# Patient Record
Sex: FEMALE | Race: OTHER | NOT HISPANIC OR LATINO | URBAN - METROPOLITAN AREA
[De-identification: names, ages, dates, MRNs, and addresses within clinical notes are randomized per-mention and may not be internally consistent; named-entity substitution may affect disease eponyms.]

---

## 2019-11-16 ENCOUNTER — OUTPATIENT (OUTPATIENT)
Dept: INPATIENT UNIT | Facility: HOSPITAL | Age: 29
LOS: 1 days | Discharge: HOME | End: 2019-11-16
Payer: MEDICAID

## 2019-11-16 ENCOUNTER — EMERGENCY (EMERGENCY)
Facility: HOSPITAL | Age: 29
LOS: 1 days | Discharge: STILL ADMITTED OR ADMINISTRATIVE | End: 2019-11-16
Admitting: EMERGENCY MEDICINE

## 2019-11-16 VITALS
DIASTOLIC BLOOD PRESSURE: 75 MMHG | HEART RATE: 92 BPM | OXYGEN SATURATION: 100 % | RESPIRATION RATE: 16 BRPM | TEMPERATURE: 98 F | SYSTOLIC BLOOD PRESSURE: 124 MMHG

## 2019-11-16 VITALS — HEART RATE: 80 BPM | SYSTOLIC BLOOD PRESSURE: 104 MMHG | DIASTOLIC BLOOD PRESSURE: 65 MMHG

## 2019-11-16 VITALS
TEMPERATURE: 98 F | HEART RATE: 89 BPM | SYSTOLIC BLOOD PRESSURE: 111 MMHG | RESPIRATION RATE: 16 BRPM | DIASTOLIC BLOOD PRESSURE: 65 MMHG

## 2019-11-16 DIAGNOSIS — O26.899 OTHER SPECIFIED PREGNANCY RELATED CONDITIONS, UNSPECIFIED TRIMESTER: ICD-10-CM

## 2019-11-16 DIAGNOSIS — Z3A.00 WEEKS OF GESTATION OF PREGNANCY NOT SPECIFIED: ICD-10-CM

## 2019-11-16 PROCEDURE — 99214 OFFICE O/P EST MOD 30 MIN: CPT | Mod: 25

## 2019-11-16 PROCEDURE — 76818 FETAL BIOPHYS PROFILE W/NST: CPT | Mod: 26

## 2019-11-16 PROCEDURE — 59025 FETAL NON-STRESS TEST: CPT | Mod: 26,59

## 2019-11-16 NOTE — OB PROVIDER TRIAGE NOTE - HISTORY OF PRESENT ILLNESS
Patient presented stating she did not have prenatal care for the last 1 months, recent immigrant form [facility] (3 weeks ago); reports mild cramping, + fetal movements.  Patient stated she had sporadic care in [facility], denies complications.  An  phone was attempted to use, but there were no Sami  available at that time; patient's nephew was translating.

## 2019-11-16 NOTE — OB PROVIDER TRIAGE NOTE - PMH
Statement Selected <<----- Click to add NO pertinent Past Medical History No pertinent past medical history

## 2019-11-16 NOTE — ED ADULT TRIAGE NOTE - CHIEF COMPLAINT QUOTE
Pt is Faroese speaking only.  phone used. Pt is 9 months pregnant. [place]. TAMMY 19. LMP 19. . C/o lower abdominal pain since this morning Denies vaginal bleeding.

## 2019-11-16 NOTE — OB PROVIDER TRIAGE NOTE - NSOBPROVIDERNOTE_OBGYN_ALL_OB_FT
28 yo , EGA@39.3 weeks presented stating she did not have prenatal care for the last 1 months, recent immigrant form [place] (3 weeks ago); reports mild cramping, + fetal movements.  Patient stated she had sporadic care in [facility], denies complications.  An  phone was attempted to use, but there were no Turkmen  available at that time; patient's nephew was translating.   Med hx: denies  Surg hx: denies  Meds: none  Allergy: NKDA  Upon evaluation, Vital Signs: T(C): 36.9 (2019 15:38), HR: 80 (2019 16:04) (80 - 92); BP: 104/65 (2019 16:04) (104/65 - 124/75)  RR: 16 (2019 15:38) ; SpO2: 100% (2019 13:39) ; category 1 FHTs, occasional mild contractions, SVE: closed/long/-3, vertex; US: vertex, posterior placenta, EFW 3219 g, BPP   A: 28 yo , EGA@39.3 weeks, not in labor, insufficient prenatal care  P: discharge home with labor precautions, fetal movements count; OB clinic telephone # was provided; emailed [facility] to establish care ASAP.

## 2019-11-16 NOTE — OB PROVIDER TRIAGE NOTE - CURRENT PREGNANCY COMPLICATIONS, OB PROFILE
insufficient prenatal care (couple of visits in [facility])/None

## 2019-11-16 NOTE — ED ADULT TRIAGE NOTE - DATE/TIME OF ACCEPTANCE
Problem: Discharge Planning  Goal: *Discharge to safe environment  Outcome: Resolved/Met Date Met:  09/29/17  PLAN: Home 16-Nov-2019 13:50

## 2019-11-16 NOTE — OB RN TRIAGE NOTE - CHIEF COMPLAINT QUOTE
contractions q5-10-doctor in [facility] said baby is big and may need surgery contractions q5-10-doctor in [facility] said baby is big and may need surgery  Lithuanian  unavailable- pt's nephew translated

## 2019-11-16 NOTE — ED ADULT NURSE NOTE - CHIEF COMPLAINT QUOTE
Pt is Albanian speaking only.  phone used. Pt is 9 months pregnant. [place]. TAMMY 19. LMP 19. . C/o lower abdominal pain since this morning Denies vaginal bleeding.

## 2019-11-16 NOTE — OB PROVIDER TRIAGE NOTE - ADDITIONAL INSTRUCTIONS
labor precautions, fetal movements count; OB clinic telephone # was provided; emailed [facility] to establish care ASAP.

## 2019-11-16 NOTE — ED ADULT NURSE NOTE - NS ED NURSE NOTE DISPO AOU DETAILS FT
spoke to Yessenia Loja provider at [phone], pt taken to L+D via w/c taken by SAYRA tech. 14 yr old nephew at side.

## 2019-11-18 ENCOUNTER — APPOINTMENT (OUTPATIENT)
Dept: OBGYN | Facility: CLINIC | Age: 29
End: 2019-11-18
Payer: MEDICAID

## 2019-11-18 ENCOUNTER — NON-APPOINTMENT (OUTPATIENT)
Age: 29
End: 2019-11-18

## 2019-11-18 ENCOUNTER — OUTPATIENT (OUTPATIENT)
Dept: OUTPATIENT SERVICES | Facility: HOSPITAL | Age: 29
LOS: 1 days | End: 2019-11-18
Payer: MEDICAID

## 2019-11-18 VITALS
WEIGHT: 185 LBS | HEIGHT: 65 IN | SYSTOLIC BLOOD PRESSURE: 116 MMHG | DIASTOLIC BLOOD PRESSURE: 80 MMHG | BODY MASS INDEX: 30.82 KG/M2

## 2019-11-18 DIAGNOSIS — Z34.00 ENCOUNTER FOR SUPERVISION OF NORMAL FIRST PREGNANCY, UNSPECIFIED TRIMESTER: ICD-10-CM

## 2019-11-18 PROBLEM — Z00.00 ENCOUNTER FOR PREVENTIVE HEALTH EXAMINATION: Status: ACTIVE | Noted: 2019-11-18

## 2019-11-18 PROBLEM — Z78.9 OTHER SPECIFIED HEALTH STATUS: Chronic | Status: ACTIVE | Noted: 2019-11-16

## 2019-11-18 LAB
BASOPHILS # BLD AUTO: 0.04 K/UL — SIGNIFICANT CHANGE UP (ref 0–0.2)
BASOPHILS NFR BLD AUTO: 0.5 % — SIGNIFICANT CHANGE UP (ref 0–2)
EOSINOPHIL # BLD AUTO: 0.21 K/UL — SIGNIFICANT CHANGE UP (ref 0–0.5)
EOSINOPHIL NFR BLD AUTO: 2.5 % — SIGNIFICANT CHANGE UP (ref 0–6)
HBA1C BLD-MCNC: 6 % — HIGH (ref 4–5.6)
HCT VFR BLD CALC: 40.4 % — SIGNIFICANT CHANGE UP (ref 34.5–45)
HGB BLD-MCNC: 12.3 G/DL — SIGNIFICANT CHANGE UP (ref 11.5–15.5)
IMM GRANULOCYTES NFR BLD AUTO: 1.6 % — HIGH (ref 0–1.5)
LYMPHOCYTES # BLD AUTO: 1.86 K/UL — SIGNIFICANT CHANGE UP (ref 1–3.3)
LYMPHOCYTES # BLD AUTO: 22.3 % — SIGNIFICANT CHANGE UP (ref 13–44)
MCHC RBC-ENTMCNC: 24.7 PG — LOW (ref 27–34)
MCHC RBC-ENTMCNC: 30.4 GM/DL — LOW (ref 32–36)
MCV RBC AUTO: 81.1 FL — SIGNIFICANT CHANGE UP (ref 80–100)
MONOCYTES # BLD AUTO: 0.8 K/UL — SIGNIFICANT CHANGE UP (ref 0–0.9)
MONOCYTES NFR BLD AUTO: 9.6 % — SIGNIFICANT CHANGE UP (ref 2–14)
NEUTROPHILS # BLD AUTO: 5.3 K/UL — SIGNIFICANT CHANGE UP (ref 1.8–7.4)
NEUTROPHILS NFR BLD AUTO: 63.5 % — SIGNIFICANT CHANGE UP (ref 43–77)
PLATELET # BLD AUTO: 158 K/UL — SIGNIFICANT CHANGE UP (ref 150–400)
RBC # BLD: 4.98 M/UL — SIGNIFICANT CHANGE UP (ref 3.8–5.2)
RBC # FLD: 15.3 % — HIGH (ref 10.3–14.5)
WBC # BLD: 8.34 K/UL — SIGNIFICANT CHANGE UP (ref 3.8–10.5)
WBC # FLD AUTO: 8.34 K/UL — SIGNIFICANT CHANGE UP (ref 3.8–10.5)

## 2019-11-18 PROCEDURE — 86900 BLOOD TYPING SEROLOGIC ABO: CPT

## 2019-11-18 PROCEDURE — 81025 URINE PREGNANCY TEST: CPT

## 2019-11-18 PROCEDURE — 81001 URINALYSIS AUTO W/SCOPE: CPT

## 2019-11-18 PROCEDURE — 83020 HEMOGLOBIN ELECTROPHORESIS: CPT | Mod: 26

## 2019-11-18 PROCEDURE — 87591 N.GONORRHOEAE DNA AMP PROB: CPT

## 2019-11-18 PROCEDURE — 87086 URINE CULTURE/COLONY COUNT: CPT

## 2019-11-18 PROCEDURE — 36415 COLL VENOUS BLD VENIPUNCTURE: CPT | Mod: NC

## 2019-11-18 PROCEDURE — 87653 STREP B DNA AMP PROBE: CPT

## 2019-11-18 PROCEDURE — 99203 OFFICE O/P NEW LOW 30 MIN: CPT | Mod: NC

## 2019-11-18 PROCEDURE — 86850 RBC ANTIBODY SCREEN: CPT

## 2019-11-18 PROCEDURE — 36415 COLL VENOUS BLD VENIPUNCTURE: CPT

## 2019-11-18 PROCEDURE — 81003 URINALYSIS AUTO W/O SCOPE: CPT

## 2019-11-18 PROCEDURE — 86803 HEPATITIS C AB TEST: CPT

## 2019-11-18 PROCEDURE — 86901 BLOOD TYPING SEROLOGIC RH(D): CPT

## 2019-11-18 PROCEDURE — 83655 ASSAY OF LEAD: CPT

## 2019-11-18 PROCEDURE — 86765 RUBEOLA ANTIBODY: CPT

## 2019-11-18 PROCEDURE — 87340 HEPATITIS B SURFACE AG IA: CPT

## 2019-11-18 PROCEDURE — 86480 TB TEST CELL IMMUN MEASURE: CPT

## 2019-11-18 PROCEDURE — 83020 HEMOGLOBIN ELECTROPHORESIS: CPT

## 2019-11-18 PROCEDURE — 83036 HEMOGLOBIN GLYCOSYLATED A1C: CPT

## 2019-11-18 PROCEDURE — 86787 VARICELLA-ZOSTER ANTIBODY: CPT

## 2019-11-18 PROCEDURE — G0463: CPT

## 2019-11-18 PROCEDURE — 87389 HIV-1 AG W/HIV-1&-2 AB AG IA: CPT

## 2019-11-18 PROCEDURE — 86762 RUBELLA ANTIBODY: CPT

## 2019-11-18 PROCEDURE — 86780 TREPONEMA PALLIDUM: CPT

## 2019-11-18 PROCEDURE — 87491 CHLMYD TRACH DNA AMP PROBE: CPT

## 2019-11-19 ENCOUNTER — ASOB RESULT (OUTPATIENT)
Age: 29
End: 2019-11-19

## 2019-11-19 ENCOUNTER — TRANSCRIPTION ENCOUNTER (OUTPATIENT)
Age: 29
End: 2019-11-19

## 2019-11-19 ENCOUNTER — APPOINTMENT (OUTPATIENT)
Dept: ANTEPARTUM | Facility: CLINIC | Age: 29
End: 2019-11-19
Payer: MEDICAID

## 2019-11-19 DIAGNOSIS — Z78.9 OTHER SPECIFIED HEALTH STATUS: ICD-10-CM

## 2019-11-19 DIAGNOSIS — Z3A.42 42 WEEKS GESTATION OF PREGNANCY: ICD-10-CM

## 2019-11-19 DIAGNOSIS — O09.33 SUPERVISION OF PREGNANCY WITH INSUFFICIENT ANTENATAL CARE, THIRD TRIMESTER: ICD-10-CM

## 2019-11-19 LAB
APPEARANCE UR: ABNORMAL
BACTERIA # UR AUTO: ABNORMAL
BILIRUB UR-MCNC: NEGATIVE — SIGNIFICANT CHANGE UP
C TRACH RRNA SPEC QL NAA+PROBE: SIGNIFICANT CHANGE UP
COLOR SPEC: YELLOW — SIGNIFICANT CHANGE UP
DIFF PNL FLD: NEGATIVE — SIGNIFICANT CHANGE UP
EPI CELLS # UR: 12 /HPF — HIGH (ref 0–5)
GAMMA INTERFERON BACKGROUND BLD IA-ACNC: 0.08 IU/ML — SIGNIFICANT CHANGE UP
GLUCOSE UR QL: NEGATIVE — SIGNIFICANT CHANGE UP
GROUP B BETA STREP DNA (PCR): DETECTED
GROUP B BETA STREP INTERPRETATION: SIGNIFICANT CHANGE UP
HBV SURFACE AG SER-ACNC: SIGNIFICANT CHANGE UP
HCV AB S/CO SERPL IA: 0.17 S/CO — SIGNIFICANT CHANGE UP (ref 0–0.99)
HCV AB SERPL-IMP: SIGNIFICANT CHANGE UP
HIV 1+2 AB+HIV1 P24 AG SERPL QL IA: SIGNIFICANT CHANGE UP
HYALINE CASTS # UR AUTO: 0 /LPF — SIGNIFICANT CHANGE UP (ref 0–7)
KETONES UR-MCNC: NEGATIVE — SIGNIFICANT CHANGE UP
LEUKOCYTE ESTERASE UR-ACNC: NEGATIVE — SIGNIFICANT CHANGE UP
M TB IFN-G BLD-IMP: ABNORMAL
M TB IFN-G CD4+ BCKGRND COR BLD-ACNC: -0.05 IU/ML — SIGNIFICANT CHANGE UP
M TB IFN-G CD4+CD8+ BCKGRND COR BLD-ACNC: -0.06 IU/ML — SIGNIFICANT CHANGE UP
MEV IGG SER-ACNC: >300 AU/ML — SIGNIFICANT CHANGE UP
MEV IGG+IGM SER-IMP: POSITIVE — SIGNIFICANT CHANGE UP
N GONORRHOEA RRNA SPEC QL NAA+PROBE: SIGNIFICANT CHANGE UP
NITRITE UR-MCNC: NEGATIVE — SIGNIFICANT CHANGE UP
PH UR: 6 — SIGNIFICANT CHANGE UP (ref 5–8)
PROT UR-MCNC: ABNORMAL
QUANT TB PLUS MITOGEN MINUS NIL: 0.04 IU/ML — SIGNIFICANT CHANGE UP
RBC CASTS # UR COMP ASSIST: 2 /HPF — SIGNIFICANT CHANGE UP (ref 0–4)
RUBV IGG SER-ACNC: 5.1 INDEX — SIGNIFICANT CHANGE UP
RUBV IGG SER-IMP: POSITIVE — SIGNIFICANT CHANGE UP
SOURCE GROUP B STREP: SIGNIFICANT CHANGE UP
SP GR SPEC: 1.03 — HIGH (ref 1.01–1.02)
SPECIMEN SOURCE: SIGNIFICANT CHANGE UP
T PALLIDUM AB TITR SER: NEGATIVE — SIGNIFICANT CHANGE UP
UROBILINOGEN FLD QL: SIGNIFICANT CHANGE UP
VZV IGG FLD QL IA: 341.3 INDEX — SIGNIFICANT CHANGE UP
VZV IGG FLD QL IA: POSITIVE — SIGNIFICANT CHANGE UP
WBC UR QL: 6 /HPF — HIGH (ref 0–5)

## 2019-11-19 PROCEDURE — 76805 OB US >/= 14 WKS SNGL FETUS: CPT

## 2019-11-20 LAB
CULTURE RESULTS: SIGNIFICANT CHANGE UP
HEMOGLOBIN INTERPRETATION: SIGNIFICANT CHANGE UP
HGB A MFR BLD: 97.9 % — SIGNIFICANT CHANGE UP (ref 95.8–98)
HGB A2 MFR BLD: 2.1 % — SIGNIFICANT CHANGE UP (ref 2–3.2)
SPECIMEN SOURCE: SIGNIFICANT CHANGE UP

## 2019-11-21 LAB — LEAD BLD-MCNC: 2 UG/DL — SIGNIFICANT CHANGE UP (ref 0–4)

## 2019-11-25 ENCOUNTER — APPOINTMENT (OUTPATIENT)
Dept: ANTEPARTUM | Facility: CLINIC | Age: 29
End: 2019-11-25
Payer: MEDICAID

## 2019-11-25 ENCOUNTER — ASOB RESULT (OUTPATIENT)
Age: 29
End: 2019-11-25

## 2019-11-25 ENCOUNTER — NON-APPOINTMENT (OUTPATIENT)
Age: 29
End: 2019-11-25

## 2019-11-25 ENCOUNTER — OUTPATIENT (OUTPATIENT)
Dept: OUTPATIENT SERVICES | Facility: HOSPITAL | Age: 29
LOS: 1 days | End: 2019-11-25
Payer: MEDICAID

## 2019-11-25 ENCOUNTER — APPOINTMENT (OUTPATIENT)
Dept: OBGYN | Facility: CLINIC | Age: 29
End: 2019-11-25
Payer: MEDICAID

## 2019-11-25 VITALS
SYSTOLIC BLOOD PRESSURE: 103 MMHG | WEIGHT: 184 LBS | DIASTOLIC BLOOD PRESSURE: 69 MMHG | HEIGHT: 65 IN | BODY MASS INDEX: 30.66 KG/M2

## 2019-11-25 DIAGNOSIS — Z78.9 OTHER SPECIFIED HEALTH STATUS: ICD-10-CM

## 2019-11-25 DIAGNOSIS — Z34.03 ENCOUNTER FOR SUPERVISION OF NORMAL FIRST PREGNANCY, THIRD TRIMESTER: ICD-10-CM

## 2019-11-25 DIAGNOSIS — O09.33 SUPERVISION OF PREGNANCY WITH INSUFFICIENT ANTENATAL CARE, THIRD TRIMESTER: ICD-10-CM

## 2019-11-25 DIAGNOSIS — Z34.00 ENCOUNTER FOR SUPERVISION OF NORMAL FIRST PREGNANCY, UNSPECIFIED TRIMESTER: ICD-10-CM

## 2019-11-25 PROCEDURE — 99213 OFFICE O/P EST LOW 20 MIN: CPT | Mod: NC

## 2019-11-25 PROCEDURE — 76818 FETAL BIOPHYS PROFILE W/NST: CPT

## 2019-11-25 PROCEDURE — 86480 TB TEST CELL IMMUN MEASURE: CPT

## 2019-11-25 PROCEDURE — G0463: CPT

## 2019-11-25 PROCEDURE — 81003 URINALYSIS AUTO W/O SCOPE: CPT

## 2019-11-25 PROCEDURE — 36415 COLL VENOUS BLD VENIPUNCTURE: CPT | Mod: NC

## 2019-11-26 DIAGNOSIS — Z3A.40 40 WEEKS GESTATION OF PREGNANCY: ICD-10-CM

## 2019-11-26 DIAGNOSIS — O09.33 SUPERVISION OF PREGNANCY WITH INSUFFICIENT ANTENATAL CARE, THIRD TRIMESTER: ICD-10-CM

## 2019-11-26 DIAGNOSIS — Z78.9 OTHER SPECIFIED HEALTH STATUS: ICD-10-CM

## 2019-11-27 ENCOUNTER — TRANSCRIPTION ENCOUNTER (OUTPATIENT)
Age: 29
End: 2019-11-27

## 2019-11-27 ENCOUNTER — INPATIENT (INPATIENT)
Facility: HOSPITAL | Age: 29
LOS: 2 days | Discharge: HOME | End: 2019-11-30
Attending: OBSTETRICS & GYNECOLOGY | Admitting: OBSTETRICS & GYNECOLOGY
Payer: MEDICAID

## 2019-11-27 VITALS — WEIGHT: 174.17 LBS

## 2019-11-27 DIAGNOSIS — O26.899 OTHER SPECIFIED PREGNANCY RELATED CONDITIONS, UNSPECIFIED TRIMESTER: ICD-10-CM

## 2019-11-27 DIAGNOSIS — Z34.80 ENCOUNTER FOR SUPERVISION OF OTHER NORMAL PREGNANCY, UNSPECIFIED TRIMESTER: ICD-10-CM

## 2019-11-27 DIAGNOSIS — Z3A.00 WEEKS OF GESTATION OF PREGNANCY NOT SPECIFIED: ICD-10-CM

## 2019-11-27 LAB
APTT BLD: 29.6 SEC — SIGNIFICANT CHANGE UP (ref 27.5–36.3)
BASOPHILS # BLD AUTO: 0.02 K/UL — SIGNIFICANT CHANGE UP (ref 0–0.2)
BASOPHILS NFR BLD AUTO: 0.3 % — SIGNIFICANT CHANGE UP (ref 0–2)
BLD GP AB SCN SERPL QL: SIGNIFICANT CHANGE UP
EOSINOPHIL # BLD AUTO: 0.22 K/UL — SIGNIFICANT CHANGE UP (ref 0–0.5)
EOSINOPHIL NFR BLD AUTO: 3.4 % — SIGNIFICANT CHANGE UP (ref 0–6)
FIBRINOGEN PPP-MCNC: 698 MG/DL — HIGH (ref 350–510)
HCT VFR BLD CALC: 37.4 % — SIGNIFICANT CHANGE UP (ref 34.5–45)
HGB BLD-MCNC: 11.8 G/DL — SIGNIFICANT CHANGE UP (ref 11.5–15.5)
IMM GRANULOCYTES NFR BLD AUTO: 1.5 % — SIGNIFICANT CHANGE UP (ref 0–1.5)
INR BLD: 1.07 RATIO — SIGNIFICANT CHANGE UP (ref 0.88–1.16)
LYMPHOCYTES # BLD AUTO: 1.4 K/UL — SIGNIFICANT CHANGE UP (ref 1–3.3)
LYMPHOCYTES # BLD AUTO: 21.5 % — SIGNIFICANT CHANGE UP (ref 13–44)
MCHC RBC-ENTMCNC: 24.9 PG — LOW (ref 27–34)
MCHC RBC-ENTMCNC: 31.6 GM/DL — LOW (ref 32–36)
MCV RBC AUTO: 79.1 FL — LOW (ref 80–100)
MONOCYTES # BLD AUTO: 0.74 K/UL — SIGNIFICANT CHANGE UP (ref 0–0.9)
MONOCYTES NFR BLD AUTO: 11.4 % — SIGNIFICANT CHANGE UP (ref 2–14)
NEUTROPHILS # BLD AUTO: 4.03 K/UL — SIGNIFICANT CHANGE UP (ref 1.8–7.4)
NEUTROPHILS NFR BLD AUTO: 61.9 % — SIGNIFICANT CHANGE UP (ref 43–77)
NRBC # BLD: 0 /100 WBCS — SIGNIFICANT CHANGE UP (ref 0–0)
PLATELET # BLD AUTO: 149 K/UL — LOW (ref 150–400)
PROTHROM AB SERPL-ACNC: 11.9 SEC — SIGNIFICANT CHANGE UP (ref 10–12.9)
RBC # BLD: 4.73 M/UL — SIGNIFICANT CHANGE UP (ref 3.8–5.2)
RBC # FLD: 15.9 % — HIGH (ref 10.3–14.5)
WBC # BLD: 6.51 K/UL — SIGNIFICANT CHANGE UP (ref 3.8–10.5)
WBC # FLD AUTO: 6.51 K/UL — SIGNIFICANT CHANGE UP (ref 3.8–10.5)

## 2019-11-27 RX ORDER — CITRIC ACID/SODIUM CITRATE 300-500 MG
15 SOLUTION, ORAL ORAL EVERY 6 HOURS
Refills: 0 | Status: DISCONTINUED | OUTPATIENT
Start: 2019-11-27 | End: 2019-11-28

## 2019-11-27 RX ORDER — SODIUM CHLORIDE 9 MG/ML
1000 INJECTION, SOLUTION INTRAVENOUS
Refills: 0 | Status: DISCONTINUED | OUTPATIENT
Start: 2019-11-27 | End: 2019-11-28

## 2019-11-27 RX ORDER — AMPICILLIN TRIHYDRATE 250 MG
2 CAPSULE ORAL ONCE
Refills: 0 | Status: COMPLETED | OUTPATIENT
Start: 2019-11-27 | End: 2019-11-27

## 2019-11-27 RX ORDER — AMPICILLIN TRIHYDRATE 250 MG
1 CAPSULE ORAL EVERY 4 HOURS
Refills: 0 | Status: DISCONTINUED | OUTPATIENT
Start: 2019-11-27 | End: 2019-11-28

## 2019-11-27 RX ORDER — OXYTOCIN 10 UNIT/ML
333.33 VIAL (ML) INJECTION
Qty: 20 | Refills: 0 | Status: DISCONTINUED | OUTPATIENT
Start: 2019-11-27 | End: 2019-11-30

## 2019-11-27 RX ADMIN — Medication 216 GRAM(S): at 13:43

## 2019-11-27 RX ADMIN — Medication 108 GRAM(S): at 17:47

## 2019-11-27 RX ADMIN — SODIUM CHLORIDE 125 MILLILITER(S): 9 INJECTION, SOLUTION INTRAVENOUS at 13:43

## 2019-11-27 NOTE — PATIENT PROFILE OB - PRO MENTAL HEALTH SX RECENT
DR. BO'S DISCHARGE INSTRUCTIONS  1) Apply ice to surgical site.  2) Call physician for:  - Any signs of wound infection, fever greater than 101.1F, bleeding, separation of incision, pus like drainage, or excessive swelling.  - Any difficulty breathing, chest pain, vomiting, or inability to tolerate oral intake.  - Any pain not controlled by pain medication or anything worrisome.  3) Keep dressing clean and dry. You may remove dressing on Day 3 and replace with band aids daily. Sling at all times except with use of CPM and therapy.   4) You may shower or bathe but keep incisions dry - Cover incision with suitable plastic covering/plastic bag while showering.  5) Activity: non weight bearing,  no active motion.  6) Avoid driving while taking narcotic pain medications or experiencing pain  7) Go to your scheduled postoperative appointment.      Diet: as tolerated    Drink a lot of fluids today.    Medications:  Take medications as prescribed for pain.  Take narcotics with a food and a daily over the counter stool softener.  Do not drive while taking narcotics or having significant pain.    Dressings/Incisions: as instructed above    Signs and symptoms of a surgical infection:  - Redness & Swelling: Increase in redness and swelling along the incision (some redness and swelling is to be expected)  - Pain: Intolerable after taking pain medication (some discomfort is normal)  - Separation of the incision: any opening or pulling apart of the incision  - Drainage:  Any persistent drainage; Pus along the incision or around sutures/staples  - Fever: Temperature above 101* F. (Take your temperature if you have chills, shivering, or feel warm.)    Call your surgeon or go to the emergency room if you notice:  - Any signs of infection  - Increasing pain  - Difficulty breathing  - Uncontrolled vomiting  - Anything worrisome      Supplies: ice pack    Patient/Family given For Your Well-Being Teaching Sheet:        Same Day  Surgery  Card           __x__       none

## 2019-11-28 ENCOUNTER — RESULT REVIEW (OUTPATIENT)
Age: 29
End: 2019-11-28

## 2019-11-28 LAB
GAMMA INTERFERON BACKGROUND BLD IA-ACNC: 0.02 IU/ML — SIGNIFICANT CHANGE UP
M TB IFN-G BLD-IMP: NEGATIVE — SIGNIFICANT CHANGE UP
M TB IFN-G CD4+ BCKGRND COR BLD-ACNC: -0.01 IU/ML — SIGNIFICANT CHANGE UP
M TB IFN-G CD4+CD8+ BCKGRND COR BLD-ACNC: -0.01 IU/ML — SIGNIFICANT CHANGE UP
QUANT TB PLUS MITOGEN MINUS NIL: 0.63 IU/ML — SIGNIFICANT CHANGE UP
T PALLIDUM AB TITR SER: NEGATIVE — SIGNIFICANT CHANGE UP

## 2019-11-28 PROCEDURE — 59514 CESAREAN DELIVERY ONLY: CPT | Mod: U9

## 2019-11-28 PROCEDURE — 88307 TISSUE EXAM BY PATHOLOGIST: CPT | Mod: 26

## 2019-11-28 RX ORDER — SODIUM CHLORIDE 9 MG/ML
1000 INJECTION, SOLUTION INTRAVENOUS
Refills: 0 | Status: DISCONTINUED | OUTPATIENT
Start: 2019-11-28 | End: 2019-11-30

## 2019-11-28 RX ORDER — CEFAZOLIN SODIUM 1 G
2000 VIAL (EA) INJECTION ONCE
Refills: 0 | Status: COMPLETED | OUTPATIENT
Start: 2019-11-28 | End: 2019-11-28

## 2019-11-28 RX ORDER — SIMETHICONE 80 MG/1
80 TABLET, CHEWABLE ORAL EVERY 4 HOURS
Refills: 0 | Status: DISCONTINUED | OUTPATIENT
Start: 2019-11-28 | End: 2019-11-30

## 2019-11-28 RX ORDER — HEPARIN SODIUM 5000 [USP'U]/ML
5000 INJECTION INTRAVENOUS; SUBCUTANEOUS EVERY 12 HOURS
Refills: 0 | Status: DISCONTINUED | OUTPATIENT
Start: 2019-11-29 | End: 2019-11-30

## 2019-11-28 RX ORDER — OXYTOCIN 10 UNIT/ML
2 VIAL (ML) INJECTION
Qty: 30 | Refills: 0 | Status: DISCONTINUED | OUTPATIENT
Start: 2019-11-28 | End: 2019-11-28

## 2019-11-28 RX ORDER — BUTORPHANOL TARTRATE 2 MG/ML
2 INJECTION, SOLUTION INTRAMUSCULAR; INTRAVENOUS ONCE
Refills: 0 | Status: DISCONTINUED | OUTPATIENT
Start: 2019-11-28 | End: 2019-11-28

## 2019-11-28 RX ORDER — AMPICILLIN TRIHYDRATE 250 MG
1 CAPSULE ORAL EVERY 4 HOURS
Refills: 0 | Status: DISCONTINUED | OUTPATIENT
Start: 2019-11-28 | End: 2019-11-28

## 2019-11-28 RX ORDER — ACETAMINOPHEN 500 MG
1000 TABLET ORAL ONCE
Refills: 0 | Status: COMPLETED | OUTPATIENT
Start: 2019-11-28 | End: 2019-11-28

## 2019-11-28 RX ORDER — TETANUS TOXOID, REDUCED DIPHTHERIA TOXOID AND ACELLULAR PERTUSSIS VACCINE, ADSORBED 5; 2.5; 8; 8; 2.5 [IU]/.5ML; [IU]/.5ML; UG/.5ML; UG/.5ML; UG/.5ML
0.5 SUSPENSION INTRAMUSCULAR ONCE
Refills: 0 | Status: DISCONTINUED | OUTPATIENT
Start: 2019-11-28 | End: 2019-11-30

## 2019-11-28 RX ORDER — CEFAZOLIN SODIUM 1 G
1000 VIAL (EA) INJECTION EVERY 8 HOURS
Refills: 0 | Status: COMPLETED | OUTPATIENT
Start: 2019-11-28 | End: 2019-11-29

## 2019-11-28 RX ORDER — FOLIC ACID 0.8 MG
1 TABLET ORAL DAILY
Refills: 0 | Status: DISCONTINUED | OUTPATIENT
Start: 2019-11-28 | End: 2019-11-30

## 2019-11-28 RX ORDER — AMPICILLIN TRIHYDRATE 250 MG
2 CAPSULE ORAL ONCE
Refills: 0 | Status: COMPLETED | OUTPATIENT
Start: 2019-11-28 | End: 2019-11-28

## 2019-11-28 RX ORDER — OXYTOCIN 10 UNIT/ML
333.33 VIAL (ML) INJECTION
Qty: 20 | Refills: 0 | Status: DISCONTINUED | OUTPATIENT
Start: 2019-11-28 | End: 2019-11-30

## 2019-11-28 RX ORDER — GLYCERIN ADULT
1 SUPPOSITORY, RECTAL RECTAL AT BEDTIME
Refills: 0 | Status: DISCONTINUED | OUTPATIENT
Start: 2019-11-28 | End: 2019-11-30

## 2019-11-28 RX ORDER — FERROUS SULFATE 325(65) MG
325 TABLET ORAL DAILY
Refills: 0 | Status: DISCONTINUED | OUTPATIENT
Start: 2019-11-28 | End: 2019-11-30

## 2019-11-28 RX ORDER — KETOROLAC TROMETHAMINE 30 MG/ML
30 SYRINGE (ML) INJECTION EVERY 6 HOURS
Refills: 0 | Status: DISCONTINUED | OUTPATIENT
Start: 2019-11-28 | End: 2019-11-29

## 2019-11-28 RX ORDER — LANOLIN
1 OINTMENT (GRAM) TOPICAL EVERY 6 HOURS
Refills: 0 | Status: DISCONTINUED | OUTPATIENT
Start: 2019-11-28 | End: 2019-11-30

## 2019-11-28 RX ORDER — DIPHENHYDRAMINE HCL 50 MG
25 CAPSULE ORAL EVERY 6 HOURS
Refills: 0 | Status: DISCONTINUED | OUTPATIENT
Start: 2019-11-28 | End: 2019-11-30

## 2019-11-28 RX ORDER — MAGNESIUM HYDROXIDE 400 MG/1
30 TABLET, CHEWABLE ORAL
Refills: 0 | Status: DISCONTINUED | OUTPATIENT
Start: 2019-11-28 | End: 2019-11-30

## 2019-11-28 RX ORDER — AZITHROMYCIN 500 MG/1
500 TABLET, FILM COATED ORAL ONCE
Refills: 0 | Status: COMPLETED | OUTPATIENT
Start: 2019-11-28 | End: 2019-11-28

## 2019-11-28 RX ADMIN — Medication 100 MILLIGRAM(S): at 16:32

## 2019-11-28 RX ADMIN — SODIUM CHLORIDE 125 MILLILITER(S): 9 INJECTION, SOLUTION INTRAVENOUS at 19:57

## 2019-11-28 RX ADMIN — Medication 100 MILLIGRAM(S): at 22:27

## 2019-11-28 RX ADMIN — Medication 400 MILLIGRAM(S): at 19:56

## 2019-11-28 RX ADMIN — SODIUM CHLORIDE 125 MILLILITER(S): 9 INJECTION, SOLUTION INTRAVENOUS at 12:12

## 2019-11-28 RX ADMIN — Medication 216 GRAM(S): at 12:16

## 2019-11-28 RX ADMIN — Medication 2 MILLIUNIT(S)/MIN: at 11:46

## 2019-11-28 RX ADMIN — Medication 25 MILLIGRAM(S): at 04:18

## 2019-11-28 RX ADMIN — BUTORPHANOL TARTRATE 2 MILLIGRAM(S): 2 INJECTION, SOLUTION INTRAMUSCULAR; INTRAVENOUS at 05:00

## 2019-11-28 RX ADMIN — BUTORPHANOL TARTRATE 2 MILLIGRAM(S): 2 INJECTION, SOLUTION INTRAMUSCULAR; INTRAVENOUS at 04:17

## 2019-11-28 RX ADMIN — Medication 0.25 MILLIGRAM(S): at 04:35

## 2019-11-28 RX ADMIN — Medication 1000 MILLIUNIT(S)/MIN: at 17:00

## 2019-11-28 NOTE — CHART NOTE - NSCHARTNOTEFT_GEN_A_CORE
Patient seen at bedside, resting comfortably offers no new complaints. Pain controlled at this time. Huber in place. Currently NPO. Baby in nursery.  Denies HA, CP, SOB, N/V/D, dizziness, palpitations, worsening abdominal pain, worsening vaginal bleeding, or any other concerns.    Vital Signs Last 24 Hrs  T(C): 37.3 (28 Nov 2019 19:30), Max: 37.3 (28 Nov 2019 17:25)  T(F): 100.3 (28 Nov 2019 19:30), Max: 99.2 (28 Nov 2019 19:30)  HR: 22 (28 Nov 2019 19:30) (21 - 97)  BP: 122/55 (28 Nov 2019 19:30) (103/58 - 124/62)  RR: 22 (28 Nov 2019 19:30) (21 - 23)  SpO2: 98% (28 Nov 2019 19:30) (97% - 100%)    Gen: A&O x 3, NAD  Chest: CTA B/L  Cardiac: S1, S2  RRR  Breast: Soft, nontender, nonengorged  Abdomen: +BS; soft; NT; ND; Dressing C/D/I  Gyn: Moderate lochia  Ext: Nontender, DTRS 2+, no worsening edema, venodynes intact                          11.8   6.51  )-----------( 149      ( 27 Nov 2019 13:23 )             37.4       A/P: POD #0 s/p c/s       -Pain management as needed  -f/u CBC in am   -DC huber f/u void in am   -continue ancef x24hrs for failed vacuum and prophylaxis  -Advance diet with flatus  -Encouraged breastfeeding   -incentive spirometer use  -VTE prophylaxis  -CXR pp for gold quant +  -d/w Dr Colon

## 2019-11-29 LAB
HCT VFR BLD CALC: 35.1 % — SIGNIFICANT CHANGE UP (ref 34.5–45)
HGB BLD-MCNC: 10.5 G/DL — LOW (ref 11.5–15.5)
MCHC RBC-ENTMCNC: 24.4 PG — LOW (ref 27–34)
MCHC RBC-ENTMCNC: 29.9 GM/DL — LOW (ref 32–36)
MCV RBC AUTO: 81.4 FL — SIGNIFICANT CHANGE UP (ref 80–100)
NRBC # BLD: 0 /100 WBCS — SIGNIFICANT CHANGE UP (ref 0–0)
PLATELET # BLD AUTO: 139 K/UL — LOW (ref 150–400)
RBC # BLD: 4.31 M/UL — SIGNIFICANT CHANGE UP (ref 3.8–5.2)
RBC # FLD: 16.4 % — HIGH (ref 10.3–14.5)
WBC # BLD: 12.23 K/UL — HIGH (ref 3.8–10.5)
WBC # FLD AUTO: 12.23 K/UL — HIGH (ref 3.8–10.5)

## 2019-11-29 RX ORDER — ACETAMINOPHEN 500 MG
975 TABLET ORAL EVERY 8 HOURS
Refills: 0 | Status: DISCONTINUED | OUTPATIENT
Start: 2019-11-29 | End: 2019-11-30

## 2019-11-29 RX ORDER — IBUPROFEN 200 MG
600 TABLET ORAL EVERY 6 HOURS
Refills: 0 | Status: DISCONTINUED | OUTPATIENT
Start: 2019-11-29 | End: 2019-11-30

## 2019-11-29 RX ORDER — OXYCODONE AND ACETAMINOPHEN 5; 325 MG/1; MG/1
2 TABLET ORAL EVERY 4 HOURS
Refills: 0 | Status: DISCONTINUED | OUTPATIENT
Start: 2019-11-29 | End: 2019-11-30

## 2019-11-29 RX ADMIN — Medication 30 MILLIGRAM(S): at 06:33

## 2019-11-29 RX ADMIN — Medication 1 MILLIGRAM(S): at 12:47

## 2019-11-29 RX ADMIN — SIMETHICONE 80 MILLIGRAM(S): 80 TABLET, CHEWABLE ORAL at 12:47

## 2019-11-29 RX ADMIN — Medication 600 MILLIGRAM(S): at 19:34

## 2019-11-29 RX ADMIN — Medication 325 MILLIGRAM(S): at 12:47

## 2019-11-29 RX ADMIN — HEPARIN SODIUM 5000 UNIT(S): 5000 INJECTION INTRAVENOUS; SUBCUTANEOUS at 06:18

## 2019-11-29 RX ADMIN — Medication 30 MILLIGRAM(S): at 00:06

## 2019-11-29 RX ADMIN — Medication 25 MILLIGRAM(S): at 06:39

## 2019-11-29 RX ADMIN — Medication 1 TABLET(S): at 12:47

## 2019-11-29 RX ADMIN — Medication 30 MILLIGRAM(S): at 12:47

## 2019-11-29 RX ADMIN — Medication 30 MILLIGRAM(S): at 13:05

## 2019-11-29 RX ADMIN — Medication 600 MILLIGRAM(S): at 20:30

## 2019-11-29 RX ADMIN — Medication 100 MILLIGRAM(S): at 06:18

## 2019-11-29 RX ADMIN — Medication 30 MILLIGRAM(S): at 00:21

## 2019-11-29 RX ADMIN — HEPARIN SODIUM 5000 UNIT(S): 5000 INJECTION INTRAVENOUS; SUBCUTANEOUS at 18:48

## 2019-11-29 RX ADMIN — Medication 975 MILLIGRAM(S): at 23:24

## 2019-11-29 RX ADMIN — Medication 30 MILLIGRAM(S): at 06:18

## 2019-11-29 NOTE — PROGRESS NOTE ADULT - SUBJECTIVE AND OBJECTIVE BOX
delayed entry note    OBGYN PA NOTE POD1   Patient seen and evaluated at bedside,  resting comfortably w/o any acute  complaints.  Denies fever, HA, CP, SOB, N/V/D, dizziness, palpitations, worsening abdominal pain, worsening vaginal bleeding, or any other concerns.  Kwong in place, voiding clear urine. Denies flatus and tolerating clear diet.  [place] in Nu. Patient is attempting to breastfeed.     Vital Signs Last 24 Hrs  T(C): 36.9 (2019 11:52), Max: 37.9 (2019 20:48)  T(F): 98.4 (2019 11:52), Max: 100.3 (2019 20:48)  HR: 98 (2019 11:52) (21 - 98)  BP: 106/60 (2019 11:52) (102/60 - 122/55)  BP(mean): 79 (2019 21:40) (79 - 79)  RR: 16 (2019 11:52) (16 - 22)  SpO2: 98% (2019 11:52) (97% - 99%)    Physical Exam:     Gen: A&Ox 3, NAD  Chest: CTAB/L  Cardiac: S1+S2+ RRR  Breast: Soft, nontender, nonengorged  Abdomen: Soft, nontender, Fundus firm below umbilicus, +BS. dressing clean and dry  Gyn: Mild lochia,   Extremities: Nontender, DTRS 2+, no worsening edema                          10.5   12.23 )-----------( 139      ( 2019 07:52 )             35.1     	    A/P:  29y POD # 1 s/p primary stat  @41w1d for failed vacuum, cu  currently in stable condition  - f/u trial void   - Heparin for dvt prophylaxis  - OOB  - incentive spirometry  - dressing removed  - advance diet with flatus  - continue pain mgmt  - continue post op care    d/w Dr Ja tony attending delayed entry note    OBGYN PA NOTE POD1   Patient seen and evaluated at bedside,  resting comfortably w/o any acute  complaints.  Denies fever, HA, CP, SOB, N/V/D, dizziness, palpitations, worsening abdominal pain, worsening vaginal bleeding, or any other concerns.  Kwong in place, voiding clear urine. Denies flatus and tolerating clear diet.  [place] in nursery. Patient is attempting express breastmilk via pump.     Vital Signs Last 24 Hrs  T(C): 36.9 (2019 11:52), Max: 37.9 (2019 20:48)  T(F): 98.4 (2019 11:52), Max: 100.3 (2019 20:48)  HR: 98 (2019 11:52) (21 - 98)  BP: 106/60 (2019 11:52) (102/60 - 122/55)  BP(mean): 79 (2019 21:40) (79 - 79)  RR: 16 (2019 11:52) (16 - 22)  SpO2: 98% (2019 11:52) (97% - 99%)    Physical Exam:     Gen: A&Ox 3, NAD  Chest: CTAB/L  Cardiac: S1+S2+ RRR  Breast: Soft, nontender, nonengorged  Abdomen: Soft, nontender, Fundus firm below umbilicus, +BS. dressing clean and dry  Gyn: Mild lochia,   Extremities: Nontender, DTRS 2+, no worsening edema                          10.5   12.23 )-----------( 139      ( 2019 07:52 )             35.1     	    A/P:  29y POD # 1 s/p primary stat  @41w1d for failed vacuum, cu  currently in stable condition  - f/u trial void   - Heparin for dvt prophylaxis  - OOB  - incentive spirometry  - dressing removed  - advance diet with flatus  - continue pain mgmt  - continue post op care    d/w Dr Ja tony attending

## 2019-11-30 ENCOUNTER — TRANSCRIPTION ENCOUNTER (OUTPATIENT)
Age: 29
End: 2019-11-30

## 2019-11-30 VITALS
TEMPERATURE: 98 F | SYSTOLIC BLOOD PRESSURE: 115 MMHG | HEART RATE: 89 BPM | RESPIRATION RATE: 17 BRPM | OXYGEN SATURATION: 99 % | DIASTOLIC BLOOD PRESSURE: 74 MMHG

## 2019-11-30 LAB
BASOPHILS # BLD AUTO: 0.03 K/UL — SIGNIFICANT CHANGE UP (ref 0–0.2)
BASOPHILS NFR BLD AUTO: 0.2 % — SIGNIFICANT CHANGE UP (ref 0–2)
EOSINOPHIL # BLD AUTO: 0.3 K/UL — SIGNIFICANT CHANGE UP (ref 0–0.5)
EOSINOPHIL NFR BLD AUTO: 2.2 % — SIGNIFICANT CHANGE UP (ref 0–6)
HCT VFR BLD CALC: 33.2 % — LOW (ref 34.5–45)
HGB BLD-MCNC: 10.3 G/DL — LOW (ref 11.5–15.5)
IMM GRANULOCYTES NFR BLD AUTO: 0.7 % — SIGNIFICANT CHANGE UP (ref 0–1.5)
LYMPHOCYTES # BLD AUTO: 16.2 % — SIGNIFICANT CHANGE UP (ref 13–44)
LYMPHOCYTES # BLD AUTO: 2.17 K/UL — SIGNIFICANT CHANGE UP (ref 1–3.3)
MCHC RBC-ENTMCNC: 24.9 PG — LOW (ref 27–34)
MCHC RBC-ENTMCNC: 31 GM/DL — LOW (ref 32–36)
MCV RBC AUTO: 80.2 FL — SIGNIFICANT CHANGE UP (ref 80–100)
MONOCYTES # BLD AUTO: 1.27 K/UL — HIGH (ref 0–0.9)
MONOCYTES NFR BLD AUTO: 9.5 % — SIGNIFICANT CHANGE UP (ref 2–14)
NEUTROPHILS # BLD AUTO: 9.56 K/UL — HIGH (ref 1.8–7.4)
NEUTROPHILS NFR BLD AUTO: 71.2 % — SIGNIFICANT CHANGE UP (ref 43–77)
NRBC # BLD: 0 /100 WBCS — SIGNIFICANT CHANGE UP (ref 0–0)
PLATELET # BLD AUTO: 167 K/UL — SIGNIFICANT CHANGE UP (ref 150–400)
RBC # BLD: 4.14 M/UL — SIGNIFICANT CHANGE UP (ref 3.8–5.2)
RBC # FLD: 16.5 % — HIGH (ref 10.3–14.5)
WBC # BLD: 13.43 K/UL — HIGH (ref 3.8–10.5)
WBC # FLD AUTO: 13.43 K/UL — HIGH (ref 3.8–10.5)

## 2019-11-30 PROCEDURE — 88307 TISSUE EXAM BY PATHOLOGIST: CPT

## 2019-11-30 PROCEDURE — 85610 PROTHROMBIN TIME: CPT

## 2019-11-30 PROCEDURE — 85730 THROMBOPLASTIN TIME PARTIAL: CPT

## 2019-11-30 PROCEDURE — G0463: CPT

## 2019-11-30 PROCEDURE — 86780 TREPONEMA PALLIDUM: CPT

## 2019-11-30 PROCEDURE — 59050 FETAL MONITOR W/REPORT: CPT

## 2019-11-30 PROCEDURE — 36415 COLL VENOUS BLD VENIPUNCTURE: CPT

## 2019-11-30 PROCEDURE — 85027 COMPLETE CBC AUTOMATED: CPT

## 2019-11-30 PROCEDURE — 59025 FETAL NON-STRESS TEST: CPT

## 2019-11-30 PROCEDURE — 85384 FIBRINOGEN ACTIVITY: CPT

## 2019-11-30 PROCEDURE — 86900 BLOOD TYPING SEROLOGIC ABO: CPT

## 2019-11-30 PROCEDURE — 86850 RBC ANTIBODY SCREEN: CPT

## 2019-11-30 PROCEDURE — 86923 COMPATIBILITY TEST ELECTRIC: CPT

## 2019-11-30 PROCEDURE — 86901 BLOOD TYPING SEROLOGIC RH(D): CPT

## 2019-11-30 RX ORDER — FERROUS SULFATE 325(65) MG
1 TABLET ORAL
Qty: 90 | Refills: 0
Start: 2019-11-30 | End: 2019-12-29

## 2019-11-30 RX ORDER — SENNOSIDES/DOCUSATE SODIUM 8.6MG-50MG
2 TABLET ORAL
Qty: 60 | Refills: 0
Start: 2019-11-30 | End: 2019-12-29

## 2019-11-30 RX ORDER — IBUPROFEN 200 MG
1 TABLET ORAL
Qty: 120 | Refills: 0
Start: 2019-11-30 | End: 2019-12-29

## 2019-11-30 RX ADMIN — Medication 975 MILLIGRAM(S): at 06:50

## 2019-11-30 RX ADMIN — HEPARIN SODIUM 5000 UNIT(S): 5000 INJECTION INTRAVENOUS; SUBCUTANEOUS at 05:59

## 2019-11-30 RX ADMIN — Medication 975 MILLIGRAM(S): at 05:59

## 2019-11-30 RX ADMIN — Medication 975 MILLIGRAM(S): at 00:20

## 2019-11-30 NOTE — PROGRESS NOTE ADULT - PROBLEM SELECTOR PLAN 1
A/P: 28y/o POD#2 s/p stat c/s @41 weeks 1 day AOD failed vaccuum, stable   -discharge home  -discharge instructions given to the patient  -f/u in 2 weeks for wound check and in 6 weeks for postpartum visit  dw Dr. Amna tony attending

## 2019-11-30 NOTE — DISCHARGE NOTE OB - CARE PLAN
Principal Discharge DX:	 delivery, delivered, current hospitalization  Goal:	postop care, pain mngt, breastfeeding education  Assessment and plan of treatment:	no sex, nothing in the vagina for 6 weeks, no heavy lifting/pushing, no bending for 4 weeks, no strenuous activity, motrin prn for pain, keep incision clean and dry, shower daily, ambulate, fiber diet, f/u in 2 weeks for wound check and in 6 weeks for postpartum visit

## 2019-11-30 NOTE — PROGRESS NOTE ADULT - SUBJECTIVE AND OBJECTIVE BOX
OB PA Progress Note POD#2    Patient seen at bedside resting comfortably offers no new complaints, requesting to go home, baby was transferred last night. + Ambulation, + void without difficulty, + flatus; tolerating regular diet. both breastfeeding and bottle feeding. Denies HA, CP, SOB, N/V/D,  no bm; dizziness, palpitations, worsening abdominal pain, worsening vaginal bleeding, or any other concerns.   Vital Signs Last 24 Hrs  T(C): 36.7 (30 Nov 2019 05:33), Max: 36.9 (29 Nov 2019 18:39)  T(F): 98 (30 Nov 2019 05:33), Max: 98.4 (29 Nov 2019 18:39)  HR: 89 (30 Nov 2019 05:33) (89 - 102)  BP: 115/74 (30 Nov 2019 05:33) (102/60 - 115/74)  BP(mean): --  RR: 17 (30 Nov 2019 05:33) (17 - 17)  SpO2: 99% (30 Nov 2019 05:33) (99% - 99%)    Gen: A&O x 3, NAD  Chest: CTABL  Cardiac: S1+S2+ RRR  Breast: Soft, nontender, nonengorged  Abdomen: +BS; soft; Nontender, nondistended, fundus firm, Incision C/D/I steri strips in place   Gyn: Minimal lochia  Extremities: Nontender, no worsening edema                        10.3   13.43 )-----------( 167      ( 30 Nov 2019 07:04 )             33.2

## 2019-11-30 NOTE — DISCHARGE NOTE OB - PROVIDER TOKENS
FREE:[LAST:[[facility]],PHONE:[phone]],FAX:[(   [phone]    -],ADDRESS:[[facility]  [address] [place] [address]]]

## 2019-11-30 NOTE — DISCHARGE NOTE OB - HOSPITAL COURSE
patient presented for medical IOL for post due date@41 weeks  c/b failed vaccuum stat c/s  baby transferred

## 2019-11-30 NOTE — DISCHARGE NOTE OB - PATIENT PORTAL LINK FT
You can access the FollowMyHealth Patient Portal offered by [facility] by registering at the following website: [email or web address]. By joining [facility]’s FollowMyHealth portal, you will also be able to view your health information using other applications (apps) compatible with our system.

## 2019-11-30 NOTE — DISCHARGE NOTE OB - MEDICATION SUMMARY - MEDICATIONS TO TAKE
I will START or STAY ON the medications listed below when I get home from the hospital:    ibuprofen 600 mg oral tablet  -- 1 tab(s) by mouth every 6 hours   -- Do not take this drug if you are pregnant.  It is very important that you take or use this exactly as directed.  Do not skip doses or discontinue unless directed by your doctor.  May cause drowsiness or dizziness.  Obtain medical advice before taking any non-prescription drugs as some may affect the action of this medication.  Take with food or milk.    -- Indication: For  section    ferrous sulfate 325 mg (65 mg elemental iron) oral tablet  -- 1 tab(s) by mouth 3 times a day   -- Check with your doctor before becoming pregnant.  Do not chew, break, or crush.  May discolor urine or feces.    -- Indication: For  section    Colace 2-in-1 50 mg-8.6 mg oral tablet  -- 2 tab(s) by mouth once a day (at bedtime)   -- Medication should be taken with plenty of water.    -- Indication: For  section

## 2019-11-30 NOTE — PROGRESS NOTE ADULT - ASSESSMENT
A/P: 30y/o POD#2 s/p stat c/s @41 weeks 1 day AOD failed vaccuum, stable   -discharge home  -discharge instructions given to the patient  -f/u in 2 weeks for wound check and in 6 weeks for postpartum visit  dw Dr. Amna otny attending

## 2019-11-30 NOTE — DISCHARGE NOTE OB - PLAN OF CARE
postop care, pain mngt, breastfeeding education no sex, nothing in the vagina for 6 weeks, no heavy lifting/pushing, no bending for 4 weeks, no strenuous activity, motrin prn for pain, keep incision clean and dry, shower daily, ambulate, fiber diet, f/u in 2 weeks for wound check and in 6 weeks for postpartum visit

## 2019-12-16 ENCOUNTER — APPOINTMENT (OUTPATIENT)
Dept: OBGYN | Facility: CLINIC | Age: 29
End: 2019-12-16
Payer: MEDICAID

## 2019-12-16 ENCOUNTER — OUTPATIENT (OUTPATIENT)
Dept: OUTPATIENT SERVICES | Facility: HOSPITAL | Age: 29
LOS: 1 days | End: 2019-12-16
Payer: MEDICAID

## 2019-12-16 VITALS
SYSTOLIC BLOOD PRESSURE: 107 MMHG | OXYGEN SATURATION: 99 % | TEMPERATURE: 97.8 F | HEIGHT: 65 IN | WEIGHT: 169 LBS | RESPIRATION RATE: 18 BRPM | DIASTOLIC BLOOD PRESSURE: 70 MMHG | HEART RATE: 71 BPM | BODY MASS INDEX: 28.16 KG/M2

## 2019-12-16 DIAGNOSIS — Z34.00 ENCOUNTER FOR SUPERVISION OF NORMAL FIRST PREGNANCY, UNSPECIFIED TRIMESTER: ICD-10-CM

## 2019-12-16 PROCEDURE — 99213 OFFICE O/P EST LOW 20 MIN: CPT | Mod: NC

## 2019-12-16 PROCEDURE — G0463: CPT

## 2019-12-16 NOTE — HISTORY OF PRESENT ILLNESS
[Delivery Date: ___] : on [unfilled] [Postpartum Follow Up] : postpartum follow up [Primary C/S] : delivered by  section [Male] : Delivery History: baby boy [Breastfeeding] : currently nursing [Wt. ___] : weighing [unfilled] [Discharge HGB: ___] : hemoglobin level was [unfilled] [Discharge HCT: ___] : hematocrit level was [unfilled] [None] : No associated symptoms are reported [Complications:___] : no complications [Rubella Vaccine] : Rubella vaccine was not administered [Rhogam] : Rhogam was not administered [Pertussis Vaccine] : Pertussis vaccine was not administered [BF with Difficulty] : nursing without difficulty [Resumed Menses] : has not resumed her menses [BTL] : no tubal ligation [Intended Contraception] : the patient does not intended to use contraception postpartum [Resumed [place]] : has not resumed intercourse [Swelling] : not swollen [Clean/Dry/Intact] : clean, dry and intact [Erythema] : not erythematous [Doing Well] : is doing well [Dehiscence] : not dehisced [No [place]] : to avoid sexual intercourse [No Tampons/Suppositories] : against using tampons or vaginal suppositories [Limited ADLs] : to participate in activities of daily living with limitations [No Housework] : not to do housework [No Work] : not to work [FreeTextEntry9] : late to care from [facility], pt had 1 AP visit [No Sports] : not to participate in sports [de-identified] : incision check, healing well, no erythema, intact incision, patient ambulating without difficulty [de-identified] : failed vacuum (popped off 3x) Primary C/S  [de-identified] : follow up in 4 wks full PP visit

## 2019-12-17 DIAGNOSIS — Z09 ENCOUNTER FOR FOLLOW-UP EXAMINATION AFTER COMPLETED TREATMENT FOR CONDITIONS OTHER THAN MALIGNANT NEOPLASM: ICD-10-CM

## 2020-01-13 ENCOUNTER — APPOINTMENT (OUTPATIENT)
Dept: OBGYN | Facility: CLINIC | Age: 30
End: 2020-01-13

## 2021-03-16 NOTE — DISCHARGE NOTE OB - BREASTFEEDING PROVIDES MATERNAL HEALTH BENEFITS, DECREASED PREMENOPAUSAL BREAST CANCER, OVARIAN CANCER AND TYPE II DIABETES MELLITUS
Detail Level: Simple Price (Do Not Change): 0.00 Instructions: This plan will send the code FBSE to the PM system.  DO NOT or CHANGE the price. Statement Selected

## 2021-10-21 NOTE — DISCHARGE NOTE OB - MEDICATION SUMMARY - MEDICATIONS TO CHANGE
I will SWITCH the dose or number of times a day I take the medications listed below when I get home from the hospital:  None
finger lac, nail bed cut with a saw while cutting wood, bleeding controlled

## 2022-07-21 ENCOUNTER — INPATIENT (INPATIENT)
Facility: HOSPITAL | Age: 32
LOS: 0 days | Discharge: HOME | End: 2022-07-22
Attending: OBSTETRICS & GYNECOLOGY | Admitting: OBSTETRICS & GYNECOLOGY

## 2022-07-21 ENCOUNTER — TRANSCRIPTION ENCOUNTER (OUTPATIENT)
Age: 32
End: 2022-07-21

## 2022-07-21 VITALS
OXYGEN SATURATION: 100 % | RESPIRATION RATE: 15 BRPM | SYSTOLIC BLOOD PRESSURE: 102 MMHG | DIASTOLIC BLOOD PRESSURE: 55 MMHG | TEMPERATURE: 98 F | HEART RATE: 85 BPM

## 2022-07-21 LAB
APPEARANCE UR: ABNORMAL
BACTERIA # UR AUTO: ABNORMAL
BASOPHILS # BLD AUTO: 0.04 K/UL — SIGNIFICANT CHANGE UP (ref 0–0.2)
BASOPHILS NFR BLD AUTO: 0.8 % — SIGNIFICANT CHANGE UP (ref 0–2)
BILIRUB UR-MCNC: NEGATIVE — SIGNIFICANT CHANGE UP
COLOR SPEC: ABNORMAL
DIFF PNL FLD: ABNORMAL
EOSINOPHIL # BLD AUTO: 0.27 K/UL — SIGNIFICANT CHANGE UP (ref 0–0.5)
EOSINOPHIL NFR BLD AUTO: 5.4 % — SIGNIFICANT CHANGE UP (ref 0–6)
EPI CELLS # UR: 2 /HPF — SIGNIFICANT CHANGE UP (ref 0–5)
GLUCOSE UR QL: NEGATIVE — SIGNIFICANT CHANGE UP
HCT VFR BLD CALC: 40.2 % — SIGNIFICANT CHANGE UP (ref 34.5–45)
HGB BLD-MCNC: 12.5 G/DL — SIGNIFICANT CHANGE UP (ref 11.5–15.5)
IANC: 2.17 K/UL — SIGNIFICANT CHANGE UP (ref 1.8–7.4)
IMM GRANULOCYTES NFR BLD AUTO: 0.2 % — SIGNIFICANT CHANGE UP (ref 0–1.5)
KETONES UR-MCNC: NEGATIVE — SIGNIFICANT CHANGE UP
LEUKOCYTE ESTERASE UR-ACNC: ABNORMAL
LYMPHOCYTES # BLD AUTO: 1.94 K/UL — SIGNIFICANT CHANGE UP (ref 1–3.3)
LYMPHOCYTES # BLD AUTO: 39.1 % — SIGNIFICANT CHANGE UP (ref 13–44)
MCHC RBC-ENTMCNC: 25.9 PG — LOW (ref 27–34)
MCHC RBC-ENTMCNC: 31.1 GM/DL — LOW (ref 32–36)
MCV RBC AUTO: 83.4 FL — SIGNIFICANT CHANGE UP (ref 80–100)
MONOCYTES # BLD AUTO: 0.53 K/UL — SIGNIFICANT CHANGE UP (ref 0–0.9)
MONOCYTES NFR BLD AUTO: 10.7 % — SIGNIFICANT CHANGE UP (ref 2–14)
NEUTROPHILS # BLD AUTO: 2.17 K/UL — SIGNIFICANT CHANGE UP (ref 1.8–7.4)
NEUTROPHILS NFR BLD AUTO: 43.8 % — SIGNIFICANT CHANGE UP (ref 43–77)
NITRITE UR-MCNC: NEGATIVE — SIGNIFICANT CHANGE UP
NRBC # BLD: 0 /100 WBCS — SIGNIFICANT CHANGE UP
NRBC # FLD: 0 K/UL — SIGNIFICANT CHANGE UP
PH UR: 7 — SIGNIFICANT CHANGE UP (ref 5–8)
PLATELET # BLD AUTO: 196 K/UL — SIGNIFICANT CHANGE UP (ref 150–400)
PROT UR-MCNC: ABNORMAL
RBC # BLD: 4.82 M/UL — SIGNIFICANT CHANGE UP (ref 3.8–5.2)
RBC # FLD: 14.2 % — SIGNIFICANT CHANGE UP (ref 10.3–14.5)
RBC CASTS # UR COMP ASSIST: >10 /HPF — HIGH (ref 0–4)
SP GR SPEC: 1.03 — SIGNIFICANT CHANGE UP (ref 1–1.05)
UROBILINOGEN FLD QL: ABNORMAL
WBC # BLD: 4.96 K/UL — SIGNIFICANT CHANGE UP (ref 3.8–10.5)
WBC # FLD AUTO: 4.96 K/UL — SIGNIFICANT CHANGE UP (ref 3.8–10.5)
WBC UR QL: 5 /HPF — SIGNIFICANT CHANGE UP (ref 0–5)

## 2022-07-21 PROCEDURE — 76830 TRANSVAGINAL US NON-OB: CPT | Mod: 26

## 2022-07-21 PROCEDURE — 99285 EMERGENCY DEPT VISIT HI MDM: CPT

## 2022-07-21 RX ORDER — ACETAMINOPHEN 500 MG
650 TABLET ORAL ONCE
Refills: 0 | Status: COMPLETED | OUTPATIENT
Start: 2022-07-21 | End: 2022-07-21

## 2022-07-21 RX ORDER — SODIUM CHLORIDE 9 MG/ML
1000 INJECTION INTRAMUSCULAR; INTRAVENOUS; SUBCUTANEOUS ONCE
Refills: 0 | Status: COMPLETED | OUTPATIENT
Start: 2022-07-21 | End: 2022-07-21

## 2022-07-21 RX ADMIN — SODIUM CHLORIDE 1000 MILLILITER(S): 9 INJECTION INTRAMUSCULAR; INTRAVENOUS; SUBCUTANEOUS at 23:05

## 2022-07-21 RX ADMIN — Medication 650 MILLIGRAM(S): at 22:52

## 2022-07-21 NOTE — ED ADULT TRIAGE NOTE - CHIEF COMPLAINT QUOTE
Pt is 10-weeks-pregnant, c/o vaginal bleeding that started today. Also endorsing lower abdominal cramping. . LMP 22. Denies n/v. Denies any past medical Hx.

## 2022-07-21 NOTE — ED PROVIDER NOTE - PROGRESS NOTE DETAILS
DIPAK De Leon- Spoke to GYN for concern for heavy bleeding on exam. will see pt in ED. DIPAK De Leon- Spoke to GYN for concern for heavy bleeding on exam. will see pt in ED. ID# [id number] GYN suggesting to admit for d and c.

## 2022-07-21 NOTE — ED PROVIDER NOTE - NS ED ATTENDING STATEMENT MOD
This was a shared visit with the KATLIN. I reviewed and verified the documentation and independently performed the documented:

## 2022-07-21 NOTE — ED ADULT NURSE NOTE - OBJECTIVE STATEMENT
pt Slovenian speaking  service used ([id number]). pt a&ox4 ambulatory  c/o vaginal bleeding that began this morning. pt LMP 22. pt endorses passing of clots. pt denies use of bloods thinners. pt denies pregnancy complications and endorses previous  with 1st pregnancy. 18g to the LAC. labs collected and sent. pt respirations even and unlabored with no accessory muscle use. pt denies chest pain, sob, nausea, vomiting, dizziness, headache. pt skin warm and of normal appearance at this time. pt unable to quantify saturation of pads per hrs. OB resident at bedside for further eval.

## 2022-07-21 NOTE — ED PROVIDER NOTE - OBJECTIVE STATEMENT
33 y/o female c/o abd cramps and vag bld.  States she had pos home pregnancy test.  Did not know she was pregnant until today.  [place] this country.  LMP 5/25/22.  Has not been seen by an ob/gyn for this pregnancy.

## 2022-07-21 NOTE — ED PROVIDER NOTE - CARE PLAN
1 Principal Discharge DX:	Pregnancy of unknown anatomic location  Secondary Diagnosis:	Vaginal bleeding in pregnancy

## 2022-07-21 NOTE — CONSULT NOTE ADULT - SUBJECTIVE AND OBJECTIVE BOX
Tajik  #[id number]     HPI: 32y   Last Menstrual Period 2022 presents with abdominal cramping and vaginal bleeding after taking home pregnancy test last week. Patient states that she is visiting from [place] and noticed  vaginal bleeding since 1PM. Patient states that she has soaked through 15 pads. Visiting this country. LMP 22.  Has not been seen by an ob/gyn for this pregnancy. Patient states that her bleeding is worse when she stands. She denies lightheadedness, dizziness, SOB, CP, dysuria, N/V/D.     PMHX; denies  PSHX; pC/S  POBHX; Full term delivery on 2019. C/S for failed vaccuum @ [facility].   PGYNHX: Denies hx of fibroids, endometriosis, ovarian cysts, abnormal pap smears   SOCIAL: denies e/t/d use  Allergies: No Known Drug Allergies  MEDS: denies      Vital Signs Last 24 Hrs  T(C): 36.8 (2022 18:34), Max: 36.8 (2022 18:34)  T(F): 98.3 (2022 18:34), Max: 98.3 (2022 18:34)  HR: 85 (2022 18:34) (85 - 85)  BP: 102/55 (2022 18:34) (102/55 - 102/55)  RR: 15 (2022 18:34) (15 - 15)  SpO2: 100% (2022 18:34) (100% - 100%)    Parameters below as of 2022 18:34  Patient On (Oxygen Delivery Method): room air         PHYSICAL EXAM:  CHEST/LUNG: No increased work of breathing  HEART: Regular rate and rhythm  ABDOMEN: Soft, Nontender, Nondistended; Bowel sounds present  EXTREMITIES:  2+ Peripheral Pulses, No clubbing, cyanosis, or edema  PELVIC:        EXTERNAL GENITALIA: Blood trickling on labia and groin area. No rashes or lesions noted.         VAGINA: healthy pink mucosa, no gross lesions, 50cc of blood with some clots extracted from vaginal vault +pooling,         CERVIX: no lesions. 1cm open, no active bleeding through os once blood was evacuated no CMt noted.        UTERUS: about 8week size uterus palpated at midline, nontender to palpation.         ADNEXA: no adnexal masses or tenderness appreciated.    LABS:                            12.5   4.96  )-----------( 196      ( 2022 23:05 )             40.2           Urinalysis Basic - ( 2022 23:05 )    Color: Ree / Appearance: Turbid / S.031 / pH: x  Gluc: x / Ketone: Negative  / Bili: Negative / Urobili: 3 mg/dL   Blood: x / Protein: 100 mg/dL / Nitrite: Negative   Leuk Esterase: Small / RBC: >10 /HPF / WBC 5 /HPF   Sq Epi: x / Non Sq Epi: 2 /HPF / Bacteria: Few          RADIOLOGY STUDIES:  pending TVUS read

## 2022-07-21 NOTE — ED PROVIDER NOTE - ATTENDING APP SHARED VISIT CONTRIBUTION OF CARE
DR. CHAVEZ, ATTENDING MD-  I personally saw the patient with the PA and performed a substantive portion of the visit including all aspects of the medical decision making.    HPI / ROS / PE / MDM written by myself.

## 2022-07-21 NOTE — CONSULT NOTE ADULT - ASSESSMENT
A/P:  32y   Last Menstrual Period 2022 presents with abdominal cramping and vaginal bleeding after taking home pregnancy test last week. Patient likely having SAB or Incomplete AB given open cervix and heterogenous endometrium seen on TVUS. CBC wnl at this time. Patient denies symptoms of anemia. No products of conception seen in vaginal canal.     -final recs pending discussion with attending  -continue umer pad counts, consult gyn urgently if bleeding worsens.   -patient asymptomatic at this time. continue to monitor vital signs.   -repeat CBC in 4 hours.     Roz Anne, PGY2  A/P:  32y   Last Menstrual Period 2022 presents with abdominal cramping and vaginal bleeding after taking home pregnancy test last week. Patient likely having SAB or Incomplete AB given open cervix and heterogenous endometrium seen on TVUS (though final read has not resulted). CBC wnl at this time. Patient denies symptoms of anemia. No products of conception seen in vaginal canal. Given patient's bleeding, will consent for dilation and curettage.     Plan OR:   Neuro: IV pain medication prn  CV: Patient hemodynamically stable- will continue to monitor vitals closely.   Pulm: saturating well on room air   GI: NPO for OR   : Voiding spontaneously  Reproductive: SAB with increased bleeidng: patient to go to OR for dilation and curettage, possible diagnostic laparoscopy, possible exploratory laparotomy and any.  Patient counseled on risks of surgery including bleeding, infection and damage to surrounding organs.  All questions/concerns of patient addressed. All consents signed with patient.    Heme: SCD's in OR for DVT ppx.  Aggressive and early ambulation post-operatively for DVT ppx.   ID: afebrile   FEN: LR@125.  Replete electrolytes prn   Dispo: To OR for procedure as detailed above    d/w Dr. London Anne, PGY2  A/P:  32y   Last Menstrual Period 2022 presents with abdominal cramping and vaginal bleeding after taking home pregnancy test last week. Patient likely having SAB or Incomplete AB given open cervix and heterogenous endometrium seen on TVUS (though final read has not resulted). CBC wnl at this time. Patient denies symptoms of anemia. No products of conception seen in vaginal canal. Given patient's bleeding, will consent for dilation and curettage.     Plan OR:   Neuro: IV pain medication prn  CV: Patient hemodynamically stable- will continue to monitor vitals closely.   Pulm: saturating well on room air   GI: NPO for OR   : Voiding spontaneously  Reproductive: SAB with increased bleeidng: patient to go to OR for dilation and curettage, possible diagnostic laparoscopy, possible exploratory laparotomy and any.  Patient counseled on risks of surgery including bleeding, infection and damage to surrounding organs.  All questions/concerns of patient addressed. All consents signed with patient.    Heme: SCD's in OR for DVT ppx.  Aggressive and early ambulation post-operatively for DVT ppx.   ID: afebrile   FEN: LR@125.  Replete electrolytes prn   Dispo: To OR for procedure as detailed above    d/w Dr. London Anne, PGY2     31y/o   LMP 2022 with incomplete Ab for DVC  Reggie Callahan M.D.

## 2022-07-22 ENCOUNTER — TRANSCRIPTION ENCOUNTER (OUTPATIENT)
Age: 32
End: 2022-07-22

## 2022-07-22 ENCOUNTER — RESULT REVIEW (OUTPATIENT)
Age: 32
End: 2022-07-22

## 2022-07-22 VITALS
OXYGEN SATURATION: 98 % | RESPIRATION RATE: 19 BRPM | SYSTOLIC BLOOD PRESSURE: 111 MMHG | HEART RATE: 74 BPM | DIASTOLIC BLOOD PRESSURE: 62 MMHG

## 2022-07-22 DIAGNOSIS — O36.80X0 PREGNANCY WITH INCONCLUSIVE FETAL VIABILITY, NOT APPLICABLE OR UNSPECIFIED: ICD-10-CM

## 2022-07-22 DIAGNOSIS — Z98.891 HISTORY OF UTERINE SCAR FROM PREVIOUS SURGERY: Chronic | ICD-10-CM

## 2022-07-22 LAB
ALBUMIN SERPL ELPH-MCNC: 4.1 G/DL — SIGNIFICANT CHANGE UP (ref 3.3–5)
ALP SERPL-CCNC: 57 U/L — SIGNIFICANT CHANGE UP (ref 40–120)
ALT FLD-CCNC: 26 U/L — SIGNIFICANT CHANGE UP (ref 4–33)
ANION GAP SERPL CALC-SCNC: 11 MMOL/L — SIGNIFICANT CHANGE UP (ref 7–14)
AST SERPL-CCNC: 22 U/L — SIGNIFICANT CHANGE UP (ref 4–32)
BILIRUB SERPL-MCNC: 0.2 MG/DL — SIGNIFICANT CHANGE UP (ref 0.2–1.2)
BLD GP AB SCN SERPL QL: NEGATIVE — SIGNIFICANT CHANGE UP
BUN SERPL-MCNC: 6 MG/DL — LOW (ref 7–23)
CALCIUM SERPL-MCNC: 8.9 MG/DL — SIGNIFICANT CHANGE UP (ref 8.4–10.5)
CHLORIDE SERPL-SCNC: 105 MMOL/L — SIGNIFICANT CHANGE UP (ref 98–107)
CO2 SERPL-SCNC: 22 MMOL/L — SIGNIFICANT CHANGE UP (ref 22–31)
CREAT SERPL-MCNC: 0.48 MG/DL — LOW (ref 0.5–1.3)
EGFR: 129 ML/MIN/1.73M2 — SIGNIFICANT CHANGE UP
FLUAV AG NPH QL: SIGNIFICANT CHANGE UP
FLUBV AG NPH QL: SIGNIFICANT CHANGE UP
GLUCOSE SERPL-MCNC: 88 MG/DL — SIGNIFICANT CHANGE UP (ref 70–99)
HCG SERPL-ACNC: 3595 MIU/ML — SIGNIFICANT CHANGE UP
POTASSIUM SERPL-MCNC: 3.7 MMOL/L — SIGNIFICANT CHANGE UP (ref 3.5–5.3)
POTASSIUM SERPL-SCNC: 3.7 MMOL/L — SIGNIFICANT CHANGE UP (ref 3.5–5.3)
PROT SERPL-MCNC: 6.5 G/DL — SIGNIFICANT CHANGE UP (ref 6–8.3)
RH IG SCN BLD-IMP: POSITIVE — SIGNIFICANT CHANGE UP
RSV RNA NPH QL NAA+NON-PROBE: SIGNIFICANT CHANGE UP
SARS-COV-2 RNA SPEC QL NAA+PROBE: SIGNIFICANT CHANGE UP
SODIUM SERPL-SCNC: 138 MMOL/L — SIGNIFICANT CHANGE UP (ref 135–145)

## 2022-07-22 PROCEDURE — 59812 TREATMENT OF MISCARRIAGE: CPT

## 2022-07-22 PROCEDURE — 88305 TISSUE EXAM BY PATHOLOGIST: CPT | Mod: 26

## 2022-07-22 RX ORDER — OXYCODONE HYDROCHLORIDE 5 MG/1
5 TABLET ORAL ONCE
Refills: 0 | Status: DISCONTINUED | OUTPATIENT
Start: 2022-07-22 | End: 2022-07-22

## 2022-07-22 RX ORDER — ONDANSETRON 8 MG/1
4 TABLET, FILM COATED ORAL ONCE
Refills: 0 | Status: DISCONTINUED | OUTPATIENT
Start: 2022-07-22 | End: 2022-07-22

## 2022-07-22 RX ORDER — FENTANYL CITRATE 50 UG/ML
50 INJECTION INTRAVENOUS
Refills: 0 | Status: DISCONTINUED | OUTPATIENT
Start: 2022-07-22 | End: 2022-07-22

## 2022-07-22 RX ORDER — SODIUM CHLORIDE 9 MG/ML
1000 INJECTION, SOLUTION INTRAVENOUS
Refills: 0 | Status: DISCONTINUED | OUTPATIENT
Start: 2022-07-22 | End: 2022-07-22

## 2022-07-22 RX ORDER — ACETAMINOPHEN 500 MG
3 TABLET ORAL
Qty: 0 | Refills: 0 | DISCHARGE

## 2022-07-22 NOTE — H&P ADULT - ASSESSMENT
A/P:  32y   Last Menstrual Period 2022 presents with abdominal cramping and vaginal bleeding after taking home pregnancy test last week. Patient likely having SAB or Incomplete AB given open cervix, no IUP and heterogenous endometrium seen on TVUS. CBC wnl at this time. Patient denies symptoms of anemia. No products of conception seen in vaginal canal. Given patient's bleeding, will consent for dilation and curettage.     Plan OR:   Neuro: IV pain medication prn  CV: Patient hemodynamically stable- will continue to monitor vitals closely.   Pulm: saturating well on room air   GI: NPO for OR   : Voiding spontaneously  Reproductive: SAB with increased bleeidng: patient to go to OR for dilation and curettage, possible diagnostic laparoscopy, possible exploratory laparotomy and any.  Patient counseled on risks of surgery including bleeding, infection and damage to surrounding organs.  All questions/concerns of patient addressed. All consents signed with patient.    Heme: SCD's in OR for DVT ppx.  Aggressive and early ambulation post-operatively for DVT ppx.   ID: afebrile   FEN: LR@125.  Replete electrolytes prn   Dispo: To OR for procedure as detailed above    d/w Dr. London Anne, PGY2

## 2022-07-22 NOTE — ASU DISCHARGE PLAN (ADULT/PEDIATRIC) - ASU DC SPECIAL INSTRUCTIONSFT
??????? ?? ??? ???????        ??????? ??? ?????      ??????? ??? ????? ? ?? ?? ???:    1. ?????? ??? ??? ???? ???? ?? ????? ? ???? ?? ??????  2. ??? ???????? 975 ???? ???? ?? ????? ???????? ?? ??????    ???? ?????? ??? Motrin ? Tylenol ???? ???? ????.      ???? ?? ??? ???????    ?? ???? ??????? ?? ???? ?????? ???? ???? 24 ???? ??? ???????. ?? ???? ??????? ???? 24 ???? ???? ?? ???? ??? ??? ???? ????? ???? ??? ?????. ?? ???? ??? ?? ?????? (???????? ??????? ? ??????) ? ?? ???? ????? ??????? ?? ?????? ????? ?? ????? ??????? ????? ???? 4 ?????? (????????? ??? ?? ????!). ?? ???? ?? ??? ???? ?? 15 ????? ? ??? ????? ?????? ???? ???? 4 ?????? ??? ???????.      ???? ?????    ??? ??????? ??????? ??????? ??????? ??????? ?? ?????? ????? ??????? ?? ???????? ??????? ?????? ??? ???????. ??? ??? ???? ????? ????? ?? ?????? ? ???? ??? ??????? ???? ???? ????? ??????? ???? ??????? ?????? ??? ?????.        ??????? ??????    ????????? ???????? (???? ???????) ?????? ??? ??????? ????????. ?? ???? ???? ??????? ?????? ???? ??? ?????. ??? ??? ?????.      ?????? ???????  ??? ?????? ???? ?????? ??? ??????? ???????? ??? ?????. ??? ??? ? ??? ????? ???? ??? ??? ? ??? ???? ?????? ????? ???? ?? ????? ?????: ??? ?? ????? ?? ??????? ??????? ???????? ????? ? ???? ???? ?? 100.4 ???????? ? ???????? ????? ??????? ????? ?? ???????. ?????? ???????? ?????? ???? ?? ????? (???? ??? ??? ??? ???? ??? ???? ????? ????) ? ?? ??? ?????? ??? ??????. ??? ????? ???? ?? ??? ??????? ? ????? ????? ????? ???? ????? ??? ?????.    ??????  ???? ???????? ?????? ???? ?? ??? ??????? ???? ???????. ???? ?????? ????? ??????? ??? ?? ??? ?????.          Postoperative Instructions      Pain control     For pain control, take the followin. Motrin 600mg four times a day, take with food  2. Add Tylenol 975 four times a day, alternated with motrin    Motrin and Tylenol can be obtained over the counter.      Postoperative restrictions   Do not drive or make important decisions for 24 hours after anesthesia. You may feel drowsy for 24 hours and should have a responsible adult with you during that time. Nothing in the vagina (tampons, sexual intercourse), No tub baths, pools or hot tubs for 4 weeks (showers are ok!). No lifting anything heavier than 15 lbs, no strenuous exercise for 4 weeks after surgery.       Vaginal bleeding   Spotting and intermittent passage of blood clots per vagina is normal in first few weeks after surgery. If you are soaking 1 pad per hour, that is not normal and you should notify Clinic and seek medical attention right away.      Vaginal discharge   Vaginal discharge (all colors) is normal after vaginal surgery. You may have vaginal discharge during this time. This is normal.      Signs of Infection  Some postoperative pain and discomfort is normal. However, if you experience any of the following, you may be developing an infection and should be seen by your doctor: pain that does not get better with the oral medications listed above, fever greater than 100.4F, foul smelling discharge coming from the surgical site, nausea and vomiting that does not stop (especially if you are unable to tolerate oral intake), or inability to urinate. If you experience any of these symptoms, call your doctor's office to be seen right away.    Follow Up  Call Clinic to schedule a postoperative appointment in 2 weeks. The results from the procedure will be discussed with you at that time. taelimat ma baed aljiraha  alsaytarat ealaa al'alam  lilsaytarat ealaa al'alam , khudh ma marisela:  1. mutrin 600 milaghu 'arbae maraat fi alyawm , yukhadh davidson altaeam  2. 'adif taylinul 975 'arbae maraat fi alyawm bialtanawub davidson mutrin  yumkin alhusul ealaa Motrin w Tylenol bidun wasfat tibiyatin.  quyud ma baed aljiraha  la taqud alsayaarat 'aw tatakhidh qararat muhimatan limudat 24 saeat baed altakhdiri. qad tasheur bialnueas limudat 24 saeat wayajib 'an yakun maeak shakhs baligh maswuwl khilal dhalik alwaqtu. la [facility]ujad [facility]iann' [facility] (alsadadat alqutaniat , aljamaei) , la tujad 'ahwad aistihmam 'aw hamaamat sibahat 'aw 'ahwad aistihmam sakhinat limudat 4 'asabie (alaistihmam ealaa ma yuramu!). la tarfae 'aya mariann' 'athqal min 15 rtlaan , wala tumaris tamarin shaqatan limudat 4 'asabie baed aljirahati.  nazif mihbali  yueadu altabqie walmurur almutaqatie liljulatat aldamawiat fi [facility] amran tbyeyan fi al'asabie alqalilat al'uwlaa baed aljirahati. 'iidha kunt tanqae wisadatan wahidatan fi alsaaeat , fahadha lays tbyeyan wayajib ealayk 'iikhtar aleiadat watalab aleinayat altibiyat ealaa alfur.  'iifrazat mihbalia  al'iifrazat almihbalia (jono al'earl) tabieiat baed aljirahat almihbaliati. qad yakun ladayk 'iifrazat muhbaliat khilal hadha alwaqti. hadha amar tabiei.  ealamat al'iisaba  baed alalam waeadam alraahat baed aleamaliat aljirahiat 'amr tabiei. walety medranok , 'iidha wajaht serenity mimaa marisela , faqad takun msaban bieadwaa wayajib 'an yaraha tabibka: 'alam la yatahasan davidson al'adwiat alfamawiat almadhkurat 'filemon mccoya 'leia min 100.4 fiharnhayt , wa'iifrazat karihat alraayihat qadimat min aljirahati. almuaqae walghathayan walqay' aladhi la yatawaqaf (khasat 'iidha kunt ghayr kirit ealaa tahamul tanawul alfami) , 'aw eadam alqudrat ealaa altabawla. 'iidha wajahat serenity min hadhih al'aerad , faitasil bimaktab tabibik litatima ruyatuh ealaa alfur.  mutabaea  aitasil bialeiadat litahdid maweid ma baed aljirahat khilal 'usbueayni. satatimu munaqashat natayij al'iijra' maeak fi dhalik alwaqtu.      Postoperative Instructions      Pain control     For pain control, take the followin. Motrin 600mg four times a day, take with food  2. Add Tylenol 975 four times a day, alternated with motrin    Motrin and Tylenol can be obtained over the counter.      Postoperative restrictions   Do not drive or make important decisions for 24 hours after anesthesia. You may feel drowsy for 24 hours and should have a responsible adult with you during that time. Nothing in the vagina (tampons, sexual intercourse), No tub baths, pools or hot tubs for 4 weeks (showers are ok!). No lifting anything heavier than 15 lbs, no strenuous exercise for 4 weeks after surgery.       Vaginal bleeding   Spotting and intermittent passage of blood clots per vagina is normal in first few weeks after surgery. If you are soaking 1 pad per hour, that is not normal and you should notify Clinic and seek medical attention right away.      Vaginal discharge   Vaginal discharge (all colors) is normal after vaginal surgery. You may have vaginal discharge during this time. This is normal.      Signs of Infection  Some postoperative pain and discomfort is normal. However, if you experience any of the following, you may be developing an infection and should be seen by your doctor: pain that does not get better with the oral medications listed above, fever greater than 100.4F, foul smelling discharge coming from the surgical site, nausea and vomiting that does not stop (especially if you are unable to tolerate oral intake), or inability to urinate. If you experience any of these symptoms, call your doctor's office to be seen right away.    Follow Up  Call Clinic to schedule a postoperative appointment in 2 weeks. The results from the procedure will be discussed with you at that time.

## 2022-07-22 NOTE — ED ADULT NURSE REASSESSMENT NOTE - NS ED NURSE REASSESS COMMENT FT1
Pt in INT room 9. Pt Icelandic speaking  used. TORRIE Srivastava from OR called for report. Atif made aware pt has further questions and concerns about the surgery and care at this time. TORRIE Srivastava made aware and will contact OB resident to come to speak to pt further prior to going to OR. Pt made aware valuables will have to go to security for safe keeping. No complaints at this time.

## 2022-07-22 NOTE — ASU DISCHARGE PLAN (ADULT/PEDIATRIC) - CARE PROVIDER_API CALL
[facility],   [facility]  [address] [place] [address]  Ambulatory Care Unit  Oncology Building, Level C  [phone]  Phone: (   )    -  Fax: (   )    -  Established Patient  Follow Up Time: 2 weeks

## 2022-07-22 NOTE — H&P ADULT - HISTORY OF PRESENT ILLNESS
Ind[place]ian  #[id number]     HPI: 32y   Last Menstrual Period 2022 presents with abdominal cramping and vaginal bleeding after taking home pregnancy test last week. Patient states that she is visiting from [place] and noticed  vaginal bleeding since 1PM. Patient states that she has soaked through 15 pads. [place]iting this country. LMP 22.  Has not been seen by an ob/gyn for this pregnancy. Patient states that her bleeding is worse when she stands. She denies lightheadedness, dizziness, SOB, CP, dysuria, N/V/D.     PMHX; denies  PSHX; pC/S  POBHX; Full term delivery on 2019. C/S for failed vaccuum @ [facility].   PGYNHX: Denies hx of fibroids, endometriosis, ovarian cysts, abnormal pap smears   SOCIAL: denies e/t/d use  Allergies: No Known Drug Allergies  MEDS: denies

## 2022-07-22 NOTE — ASU DISCHARGE PLAN (ADULT/PEDIATRIC) - PROVIDER TOKENS
FREE:[LAST:[[facility]],PHONE:[(   )    -],FAX:[(   )    -],ADDRESS:[[facility]  [phone] [place] [address]  Ambulatory Care Unit  Oncology Building, Level C  [phone]],FOLLOWUP:[2 weeks],ESTABLISHEDPATIENT:[T]]

## 2022-07-22 NOTE — H&P ADULT - NSHPLABSRESULTS_GEN_ALL_CORE
LABS:                            12.5   4.96  )-----------( 196      ( 2022 23:05 )             40.2           Urinalysis Basic - ( 2022 23:05 )    Color: Ree / Appearance: Turbid / S.031 / pH: x  Gluc: x / Ketone: Negative  / Bili: Negative / Urobili: 3 mg/dL   Blood: x / Protein: 100 mg/dL / Nitrite: Negative   Leuk Esterase: Small / RBC: >10 /HPF / WBC 5 /HPF   Sq Epi: x / Non Sq Epi: 2 /HPF / Bacteria: Few    < from: US Transvaginal (22 @ 23:55) >    ACC: [id number] EXAM:  US TRANSVAGINAL                          PROCEDURE DATE:  2022          INTERPRETATION:  CLINICAL INFORMATION: Pregnancy of unknown location.   Vaginal bleeding.    LMP: 2022, estimated gestational age of 8 weeks 1 day.    COMPARISON: None available.    TECHNIQUE:  Endovaginal pelvic sonogram only. Color and Spectral Doppler was   performed.    FINDINGS:  Uterus: 10.6 cm x 5.7 cm x 6.2 cm. Heterogeneous avascular material   within the cervix, which is suspected to be open.  Endometrium: Distended to 19 mm, containing internal avascular   heterogeneous material. No gestational sac identified.    Right ovary: 1.9 cm x 1.3 cm x 1.3 cm. Corpus luteal cyst measures 1.2 x   1.2 x 1.4 cm. Normal arterial and venous waveforms.  Left ovary: 2.7 cm x 1.1 cm x 1.1 cm. Within normal limits. Normal   arterial and venous waveforms.    Fluid: None.    IMPRESSION:  No intrauterine or extrauterine gestational sac identified. Heterogeneous   avascular material within the endometrium and cervix likely reflecting   blood products. Suspected open cervix. Findings are suspicious for    in progress. Continued follow-up is recommended with trending of   hCG and short-term follow-up imaging.    --- End of Report ---           FIDENCIO BOYD MD; Resident Radiologist  This document has been electronically signed.  JULIA ABARCA MD; Attending Radiologist  This document has been electronically signed. 2022  1:17AM

## 2022-07-22 NOTE — BRIEF OPERATIVE NOTE - NSICDXBRIEFPROCEDURE_GEN_ALL_CORE_FT
PROCEDURES:  Dilation and curettage, uterus, using suction, with US guidance 22-Jul-2022 04:23:01  Roz Anne

## 2022-07-22 NOTE — CHART NOTE - NSCHARTNOTEFT_GEN_A_CORE
#[id number] ([place])  Received a call from OR staff stating that patient is in distress due to confusion about the risks vs benefits of the procedure. I reiterated that the point of the procedure is to clear her uterus of the products of conception, however, since she did not have a previous confirmed IUP, this does not definitive answers for pregnancy of unknown location. Patient was also advised that the risk to any surgical procedure include bleeding, infection and risk of damage to nearby organs. I explained that there is always a risk of uterine perforation during a dilation and curettage. I also explained the process by which we identify and fix complications should they arise including but not limited to a diagnostic laparoscopy, possible exploratory laparotomy and if bleeding cannot be controlled by other efforts, sometimes risks include hysterectomy. I explained to the patient that the intended procedure is not a hysterectomy, however it is a possibility if there are any complications with the procedure. Patient expressed understanding and agreed to complete the procedure as consented.     Roz Anne, PGY2

## 2022-07-22 NOTE — ASU DISCHARGE PLAN (ADULT/PEDIATRIC) - FOLLOW UP APPOINTMENTS
[facility] may also call Recovery Room (PACU) 24/7 @ [phone]

## 2022-07-22 NOTE — ASU DISCHARGE PLAN (ADULT/PEDIATRIC) - NS MD DC FALL RISK RISK
For information on Fall & Injury Prevention, visit: [email or web address] OR  [email or web address] OR  https://www.cdc.gov/steadi/patient.html

## 2022-07-22 NOTE — ASU DISCHARGE PLAN (ADULT/PEDIATRIC) - NURSING INSTRUCTIONS
You received PO Tylenol for pain management at 11:00PM. Please DO NOT take any Tylenol (Acetaminophen) containing products, such as Vicodin, Percocet, Excedrin, and cold medications for the next 6 hours (until 5:00AM). DO NOT TAKE MORE THAN 3000 MG OF TYLENOL in a 24 hour period.

## 2022-07-22 NOTE — H&P ADULT - NSHPPHYSICALEXAM_GEN_ALL_CORE
Vital Signs Last 24 Hrs  T(C): 36.8 (21 Jul 2022 18:34), Max: 36.8 (21 Jul 2022 18:34)  T(F): 98.3 (21 Jul 2022 18:34), Max: 98.3 (21 Jul 2022 18:34)  HR: 85 (21 Jul 2022 18:34) (85 - 85)  BP: 102/55 (21 Jul 2022 18:34) (102/55 - 102/55)  RR: 15 (21 Jul 2022 18:34) (15 - 15)  SpO2: 100% (21 Jul 2022 18:34) (100% - 100%)    Parameters below as of 21 Jul 2022 18:34  Patient On (Oxygen Delivery Method): room air         PHYSICAL EXAM:  CHEST/LUNG: No increased work of breathing  HEART: Regular rate and rhythm  ABDOMEN: Soft, Nontender, Nondistended; Bowel sounds present  EXTREMITIES:  2+ Peripheral Pulses, No clubbing, cyanosis, or edema  PELVIC:        EXTERNAL GENITALIA: Blood trickling on labia and groin area. No rashes or lesions noted.         VAGINA: healthy pink mucosa, no gross lesions, 50cc of blood with some clots extracted from vaginal vault +pooling,         CERVIX: no lesions. 1cm open, no active bleeding through os once blood was evacuated no CMt noted.        UTERUS: about 8week size uterus palpated at midline, nontender to palpation.         ADNEXA: no adnexal masses or tenderness appreciated.

## 2022-07-22 NOTE — BRIEF OPERATIVE NOTE - OPERATION/FINDINGS
Cervix dilated to 2-3 cm. Clot in vaginal vault. Suction curettage performed without incident. Sharp curettage used to confirm removal of POC. US showed think endometrial stripe at close of procedure. Cervix dilated to 2-3 cm. Clot in vaginal vault. Suction curettage performed without incident. Sharp curettage used to confirm removal of POC. US showed thin endometrial stripe at close of procedure.

## 2022-07-23 LAB
CULTURE RESULTS: SIGNIFICANT CHANGE UP
SPECIMEN SOURCE: SIGNIFICANT CHANGE UP

## 2022-07-28 LAB — SURGICAL PATHOLOGY STUDY: SIGNIFICANT CHANGE UP

## 2023-08-10 NOTE — ED ADULT TRIAGE NOTE - PRO INTERPRETER NEED 2
Tamazight Consent: Written consent was obtained and risks were reviewed including but not limited to scarring, infection, bleeding, scabbing, incomplete removal, nerve damage and allergy to anesthesia.
